# Patient Record
Sex: FEMALE | Race: BLACK OR AFRICAN AMERICAN | NOT HISPANIC OR LATINO | URBAN - METROPOLITAN AREA
[De-identification: names, ages, dates, MRNs, and addresses within clinical notes are randomized per-mention and may not be internally consistent; named-entity substitution may affect disease eponyms.]

---

## 2023-10-11 ENCOUNTER — APPOINTMENT (OUTPATIENT)
Dept: ORTHOPEDIC SURGERY | Facility: CLINIC | Age: 63
End: 2023-10-11
Payer: MEDICAID

## 2024-10-07 ENCOUNTER — TRANSCRIBE ORDERS (OUTPATIENT)
Dept: ORTHOPEDIC SURGERY | Facility: HOSPITAL | Age: 64
End: 2024-10-07
Payer: COMMERCIAL

## 2024-10-07 DIAGNOSIS — M54.50 LOW BACK PAIN, UNSPECIFIED BACK PAIN LATERALITY, UNSPECIFIED CHRONICITY, UNSPECIFIED WHETHER SCIATICA PRESENT: ICD-10-CM

## 2024-10-08 ENCOUNTER — HOSPITAL ENCOUNTER (OUTPATIENT)
Dept: RADIOLOGY | Facility: CLINIC | Age: 64
Discharge: HOME | End: 2024-10-08
Payer: COMMERCIAL

## 2024-10-08 ENCOUNTER — OFFICE VISIT (OUTPATIENT)
Dept: ORTHOPEDIC SURGERY | Facility: CLINIC | Age: 64
End: 2024-10-08
Payer: COMMERCIAL

## 2024-10-08 ENCOUNTER — APPOINTMENT (OUTPATIENT)
Dept: ORTHOPEDIC SURGERY | Facility: CLINIC | Age: 64
End: 2024-10-08
Payer: COMMERCIAL

## 2024-10-08 DIAGNOSIS — M54.9 SPINE PAIN: ICD-10-CM

## 2024-10-08 DIAGNOSIS — M54.12 CERVICAL RADICULOPATHY: Primary | ICD-10-CM

## 2024-10-08 DIAGNOSIS — F17.200 SMOKER: ICD-10-CM

## 2024-10-08 DIAGNOSIS — M50.30 DEGENERATIVE DISC DISEASE, CERVICAL: ICD-10-CM

## 2024-10-08 DIAGNOSIS — M54.50 LOW BACK PAIN, UNSPECIFIED BACK PAIN LATERALITY, UNSPECIFIED CHRONICITY, UNSPECIFIED WHETHER SCIATICA PRESENT: ICD-10-CM

## 2024-10-08 PROCEDURE — 72050 X-RAY EXAM NECK SPINE 4/5VWS: CPT | Performed by: RADIOLOGY

## 2024-10-08 PROCEDURE — 1125F AMNT PAIN NOTED PAIN PRSNT: CPT | Performed by: PHYSICIAN ASSISTANT

## 2024-10-08 PROCEDURE — 72070 X-RAY EXAM THORAC SPINE 2VWS: CPT

## 2024-10-08 PROCEDURE — 72050 X-RAY EXAM NECK SPINE 4/5VWS: CPT

## 2024-10-08 PROCEDURE — 99213 OFFICE O/P EST LOW 20 MIN: CPT | Performed by: PHYSICIAN ASSISTANT

## 2024-10-08 PROCEDURE — 72070 X-RAY EXAM THORAC SPINE 2VWS: CPT | Performed by: RADIOLOGY

## 2024-10-08 PROCEDURE — 99203 OFFICE O/P NEW LOW 30 MIN: CPT | Performed by: PHYSICIAN ASSISTANT

## 2024-10-08 RX ORDER — METHOCARBAMOL 750 MG/1
750 TABLET, FILM COATED ORAL 3 TIMES DAILY PRN
Qty: 30 TABLET | Refills: 0 | Status: SHIPPED | OUTPATIENT
Start: 2024-10-08 | End: 2024-10-18

## 2024-10-08 RX ORDER — METHOCARBAMOL 750 MG/1
750 TABLET, FILM COATED ORAL 3 TIMES DAILY PRN
COMMUNITY
Start: 2024-07-18 | End: 2024-10-08 | Stop reason: SDUPTHER

## 2024-10-08 RX ORDER — LIDOCAINE 50 MG/G
1 PATCH TOPICAL
COMMUNITY
Start: 2024-07-11

## 2024-10-08 RX ORDER — HYDROCHLOROTHIAZIDE 12.5 MG/1
12.5 TABLET ORAL
COMMUNITY
Start: 2024-08-21 | End: 2025-02-17

## 2024-10-08 RX ORDER — DOCUSATE SODIUM 100 MG/1
CAPSULE, LIQUID FILLED ORAL
COMMUNITY

## 2024-10-08 RX ORDER — DIPHENHYDRAMINE HCL 25 MG
TABLET ORAL
COMMUNITY

## 2024-10-08 RX ORDER — DOXYLAMINE SUCCINATE 25 MG
TABLET ORAL
COMMUNITY
Start: 2024-06-12

## 2024-10-08 ASSESSMENT — ENCOUNTER SYMPTOMS
OCCASIONAL FEELINGS OF UNSTEADINESS: 0
LOSS OF SENSATION IN FEET: 0
DEPRESSION: 0

## 2024-10-08 ASSESSMENT — PAIN - FUNCTIONAL ASSESSMENT: PAIN_FUNCTIONAL_ASSESSMENT: 0-10

## 2024-10-08 ASSESSMENT — PAIN DESCRIPTION - DESCRIPTORS: DESCRIPTORS: SHARP;STABBING;ACHING

## 2024-10-08 ASSESSMENT — PAIN SCALES - GENERAL: PAINLEVEL_OUTOF10: 9

## 2024-10-08 NOTE — PROGRESS NOTES
HPI:  Jaylin Nagy is a 64 y.o. female who presents to the spine clinic to establish care. Main concern today is severe neck pain.     Reports neck and low back pain ongoing since an accident in May 2023 - patient reports she fell and landed hard on her back/tailbone and also experienced a whiplash sensation in the neck.   Reports severe neck pain, cervicogenic headaches, and decreased ROM. Admits to numbness bilateral upper extremities, R>L. No focal motor weakness, however has left shoulder RTC injury that causes pain/weakness in the left shoulder.     She has been following with [facility] (LOV 09/11/24, 07/26/24) and has MRI cervical and lumbar spine pending (scheduled for 10/10/24). She has completed multiple rounds of PT at [facility] and [facility] this past year.     She is a smoker. She is not diabetic.     ROS:  Reviewed on EMR and patient intake sheet.    PMH/SH:  Reviewed on EMR and patient intake sheet.    Exam:  Physical Exam  Spine Musculoskeletal Exam    Well appearing, NAD  Decreased ROM cervical spine with rotation, flexion/extension  + paraspinal muscle spasms  upper extremity sensation intact to light touch  Give way weakness left deltoid due to pain; remaining upper extremity motor 5/5 throughout  normal gait & station    Radiology:     Xrays cervical and thoracic spine done in the office today 10/8/24 personally reviewed. Thoracic spine radiographs unremarkable. Reversal of the normal cervical lordosis with severe disc degeneration C5-6.       Assessment and Plan:  1. Degenerative disc disease, cervical  - XR cervical spine complete 4-5 views; Future  - Referral to Physical Therapy; Future    2. Cervical radiculopathy  - methocarbamol (Robaxin) 750 mg tablet; Take 1 tablet (750 mg) by mouth 3 times a day as needed for muscle spasms for up to 10 days.  Dispense: 30 tablet; Refill: 0    3. Smoker    I reviewed the xrays performed today with the patient. At this point I agree with MRI  scans to further evaluate, and I asked her to bring a copy for review once completed to determine next steps.     In the meantime I offered Prednisone taper for pain relief which she declined. She did request refill of Robaxin 750mg today which was provided.     She was also given a new referral for aqua-therapy as this has been helpful in the past.     Patient in agreement to plan of care. Of course Jaylin Nagy is welcome to call at anytime with questions or concerns.     Cayla Arciniega PA-C  Department of Orthopaedic Surgery    [address] [place] [address]    Voicemail: [phone]   Appts: [phone]  Fax: [phone]

## 2024-10-21 ENCOUNTER — DOCUMENTATION (OUTPATIENT)
Dept: PHYSICAL THERAPY | Facility: HOSPITAL | Age: 64
End: 2024-10-21
Payer: COMMERCIAL

## 2024-10-21 NOTE — PROGRESS NOTES
Physical Therapy                 Therapy Communication Note    Patient Name: Jaylin Nagy  MRN: [id number]  Today's Date: 10/21/2024     Discipline: Physical Therapy    Missed Time: No Show    Missed Visit Reason:  Pt no show to initial physical therapy evaluation.

## 2024-10-22 ENCOUNTER — APPOINTMENT (OUTPATIENT)
Dept: ORTHOPEDIC SURGERY | Facility: CLINIC | Age: 64
End: 2024-10-22
Payer: COMMERCIAL

## 2024-11-05 ENCOUNTER — APPOINTMENT (OUTPATIENT)
Dept: ORTHOPEDIC SURGERY | Facility: CLINIC | Age: 64
End: 2024-11-05
Payer: MEDICARE

## 2024-11-12 ENCOUNTER — OFFICE VISIT (OUTPATIENT)
Dept: ORTHOPEDIC SURGERY | Facility: CLINIC | Age: 64
End: 2024-11-12
Payer: MEDICARE

## 2024-11-12 VITALS — HEIGHT: 68 IN | BODY MASS INDEX: 32.58 KG/M2 | WEIGHT: 215 LBS

## 2024-11-12 DIAGNOSIS — M54.16 LUMBAR RADICULOPATHY: ICD-10-CM

## 2024-11-12 DIAGNOSIS — M54.12 CERVICAL RADICULOPATHY: Primary | ICD-10-CM

## 2024-11-12 PROCEDURE — 99214 OFFICE O/P EST MOD 30 MIN: CPT | Performed by: ORTHOPAEDIC SURGERY

## 2024-11-12 ASSESSMENT — PAIN - FUNCTIONAL ASSESSMENT: PAIN_FUNCTIONAL_ASSESSMENT: 0-10

## 2024-11-12 NOTE — LETTER
November 12, 2024     Naida Esqueda MD  [address]  As Of 10/13/2021  [facility] [id number]    Patient: Jaylin Nagy   YOB: 1960   Date of Visit: 11/12/2024       Dear Dr. Naida Esqueda MD:    Thank you for referring Jaylin Nagy to me for evaluation. Below are my notes for this consultation.  If you have questions, please do not hesitate to call me. I look forward to following your patient along with you.       Sincerely,     Lm Faye MD      CC: No Recipients  ______________________________________________________________________________________    HPI:Jaylin Nagy is a 64-year-old woman, who comes in with complaints of bilateral upper extremity numbness and tingling right greater than left.  She also has some intermittent pain in her back and into her leg.  She has discomfort in the intrascapular region.  She has completed physical therapy.  She has not had steroid injections.  Physical therapy was completed at [facility].      ROS:  Reviewed on EMR and patient intake sheet.    PMH/SH:   Reviewed on EMR and patient intake sheet.    Exam:  Physical Exam    Constitutional: Well appearing; no acute distress  Eyes: pupils are equal and round  Psych: normal affect  Respiratory: non-labored breathing  Cardiovascular: regular rate and rhythm  GI: non-distended abdomen  Musculoskeletal: no pain with range of motion of the shoulders bilaterally; no signs of impingement  Neurologic: [4+]/5 strength in the upper extremities bilaterally]; [negative] Russ's; [no hyper-reflexia]    Radiology:  MRI from [facility] demonstrates moderately severe foraminal narrowing at C5-6 and C6-7 with associated disc degeneration.    Diagnosis:  Cervical radiculopathy    Assessment and Plan:   64-year-old woman with chronic cervical radiculopathy which has persisted despite physical therapy.  Surgical intervention would be the next step.  She would need a C5-7 ACDF.  Prior to surgery she  would need to stop smoking.  She committed to that today.  She will give us a call in 6 weeks for nicotine testing.  Assuming that is negative we will proceed.  She was given referral to pain management for lumbar injections.    We discussed the role of pain management and steroid injections.  This included a brief overview of the injection procedure itself, the rationale behind this procedure, and the appropriate expectations for treatment of the patient's pain.  A referral to a pain management specialist was offered to the patient.    The patient was in agreement with the plan. At the end of the visit today, the patient felt that all questions had been answered satisfactorily.  The patient was pleased with the visit and very appreciative for the care rendered.     Thank you very much for the kind referral.  It is a privilege, and a pleasure, to partner with you in the care of your patients.  I would be delighted to assist you with any further consultations as needed.      Lm Faye MD    Chief of Spine Surgery, [facility]  Director of Spine Service, [facility]  , Department of Orthopaedics  [facility] [employer]  [id number] [place] [address] [place] [address]  P: [phone]  [email or web address]    This note was dictated with voice recognition software.  It has not been proofread for grammatical errors, typographical mistakes or other semantic inconsistencies.

## 2024-11-12 NOTE — PROGRESS NOTES
HPI:Jaylin Nagy is a 64-year-old woman, who comes in with complaints of bilateral upper extremity numbness and tingling right greater than left.  She also has some intermittent pain in her back and into her leg.  She has discomfort in the intrascapular region.  She has completed physical therapy.  She has not had steroid injections.  Physical therapy was completed at [facility].      ROS:  Reviewed on EMR and patient intake sheet.    PMH/SH:   Reviewed on EMR and patient intake sheet.    Exam:  Physical Exam    Constitutional: Well appearing; no acute distress  Eyes: pupils are equal and round  Psych: normal affect  Respiratory: non-labored breathing  Cardiovascular: regular rate and rhythm  GI: non-distended abdomen  Musculoskeletal: no pain with range of motion of the shoulders bilaterally; no signs of impingement  Neurologic: [4+]/5 strength in the upper extremities bilaterally]; [negative] Russ's; [no hyper-reflexia]    Radiology:  MRI from [facility] demonstrates moderately severe foraminal narrowing at C5-6 and C6-7 with associated disc degeneration.    Diagnosis:  Cervical radiculopathy    Assessment and Plan:   64-year-old woman with chronic cervical radiculopathy which has persisted despite physical therapy.  Surgical intervention would be the next step.  She would need a C5-7 ACDF.  Prior to surgery she would need to stop smoking.  She committed to that today.  She will give us a call in 6 weeks for nicotine testing.  Assuming that is negative we will proceed.  She was given referral to pain management for lumbar injections.    We discussed the role of pain management and steroid injections.  This included a brief overview of the injection procedure itself, the rationale behind this procedure, and the appropriate expectations for treatment of the patient's pain.  A referral to a pain management specialist was offered to the patient.    The patient was in agreement with the plan. At the end of the  visit today, the patient felt that all questions had been answered satisfactorily.  The patient was pleased with the visit and very appreciative for the care rendered.     Thank you very much for the kind referral.  It is a privilege, and a pleasure, to partner with you in the care of your patients.  I would be delighted to assist you with any further consultations as needed.      Lm Faye MD    Chief of Spine Surgery, [facility]  Director of Spine Service, [facility]  , Department of Orthopaedics  [facility] [employer]  [address] [place] [id number]  P: [phone]  [facility]    This note was dictated with voice recognition software.  It has not been proofread for grammatical errors, typographical mistakes or other semantic inconsistencies.

## 2025-03-10 ENCOUNTER — EVALUATION (OUTPATIENT)
Dept: PHYSICAL THERAPY | Facility: HOSPITAL | Age: 65
End: 2025-03-10
Payer: COMMERCIAL

## 2025-03-10 DIAGNOSIS — M50.30 DEGENERATIVE DISC DISEASE, CERVICAL: Primary | ICD-10-CM

## 2025-03-10 PROCEDURE — 97162 PT EVAL MOD COMPLEX 30 MIN: CPT | Mod: GP | Performed by: PHYSICAL THERAPIST

## 2025-03-10 ASSESSMENT — ENCOUNTER SYMPTOMS
OCCASIONAL FEELINGS OF UNSTEADINESS: 0
DEPRESSION: 0
LOSS OF SENSATION IN FEET: 0

## 2025-03-10 ASSESSMENT — PAIN - FUNCTIONAL ASSESSMENT: PAIN_FUNCTIONAL_ASSESSMENT: 0-10

## 2025-03-10 ASSESSMENT — PAIN SCALES - GENERAL
PAINLEVEL_OUTOF10: 3
PAINLEVEL_OUTOF10: 3

## 2025-03-10 NOTE — PROGRESS NOTES
Physical Therapy    Physical Therapy Evaluation and Treatment      Patient Name: Jaylin Nagy  MRN: [id number]  Today's Date: 3-  Visit #1    Time Entry:   Time Calculation  Start Time: 1030  Stop Time: 1115  Time Calculation (min): 45 min                         Assessment:   Neck pain, Pt has chronic pain, Has request to do aquatics PT.  She has chronic pain and does not get  out of the house other than to med appointments. Hx of mental health issues and is sedentary.  Pt agrees to try pool therapy but is not interested in land based exercises or modalities. Plan to see how it goes in pool and progress as able         Plan:  OP PT Plan  Treatment/Interventions: Aquatic therapy, Education/ Instruction, Hot pack, Therapeutic exercises, Manual therapy, Neuromuscular re-education, Ultrasound  PT Plan: Skilled PT  PT Frequency: 2 times per week  Duration: 6 weeks  Onset Date: 05/31/23  Certification Period Start Date: 03/10/25  Certification Period End Date: 06/10/25  Rehab Potential: Fair (hx of not returning for PT after first visit)  Plan of Care Agreement: Patient    Current Problem:   1. Degenerative disc disease, cervical  Referral to Physical Therapy    Follow Up In Physical Therapy          Subjective    Pt states she has chronic neck and back pain and radicular symptoms in Amado UE and LE  3-10/10   She states she has gone to PT for 1 time eval at other locations but did not return to therapy in the past due to too much pain.   PT is ordered for aquatics     General: degenerative disc disease, cervical     Precautions:          Pain: 3-10/10 neck and back and Bi UE and LE radicular                 Objective        General Assessments:  Jaylin has been referred to PT for Degenerative disc disease, cervical   Neck pain for 2 years after having had a fall.  No falls reported since that time.  PT referral for aquatics PT    Pt has been to PT for neck eval at [facility] but did not return for therapy as she says  everything hurts too bad .  She had  accupuncture continues to have this  Hx of having electrical stim - did not like it  Hx of relief with heat   2-10 / 10    today 3/10 , if pain were worse she feels like she is choking.  Pain can be severe and intermittent and can last several minutes.  Numbness down arm to fingers amado less than daily.   Holding head down to fill out paperwork increased pain.  Turning head can be sharp  Pt also has entire back pain after falling 2 years ago and was hoping to do PT for her back. AMADO legs numb / tingling to toes.    She has seen surgeon and pain management-declines surgery and epidurals; saw chiropractor and had acupuncture recently     Diagnosis    1. Stenosis, cervical spine     2. Other cirrhosis of liver (HCC)     3. History of hepatitis C     4. Essential hypertension     5. Tobacco use     6. Chemical dependency (HCC)     7. Schizoaffective disorder, unspecified type (HCC)         Functional Assessments:  Independent, no falls since 2 yrs ago  Uses Door Dash to avoid leaving the house  Sedentary, goes out for med appts only  Schizoaffective disorder, hx chem dependency      Extremity/Trunk Assessments:    Cervical   Flex WNL  Side bend 20 L   20 Rt  Rotate 30 L  35 Rt     Shoulder AROM WNL    Lumbar  Flex full  Ext neutral  Side bend WFL Amado    MMT  Hip flex 4/5       Cervical back: Spasms and tenderness present.   Thoracic back: Spasms and tenderness present.   Lumbar back: Spasms and tenderness present.     Mild multi-level degenerative disc disease noted with moderate   degenerative changes at C5-C6. There are moderate spondylitic changes and   facet arthropathy noted. Anterolisthesis noted at C3-C4 and C4-C5 best   seen on flexion view and retrolisthesis at C5-C6 best seen on extension      degenerative spine changes with multilevel marginal vertebral spurs. There is C5-C6 disc space narrowing. There is mild decreased height of C5, C6, and probably C7 vertebrae, not  significantly changed from the appearance on 6/4/2023 CT. There is reversal of the cervical lordosis. Evaluation of the posterior element and neural foramina is limited without oblique views but there is multilevel facet joint arthropathy.     The alignment of the thoracic spine is normal.  No compression     No acute bony abnormality of the lumbar spine is identified. Mild diffuse degenerative changes.       Outcome Measures:   LBP 30=60%  NDI 26 raw = 52%    Treatments:  EXERCISES       Date 3-10-25      VISIT# #1 # # #    REPS REPS REPS REPS                        Doorway stretch       Neck stretch upper traps  Levator scapulae str       Cervical retraction PROM                                   HEAT              AQUATICS       HEP            EDUCATION: plan of care  . May do aquatics PT   No tx started this date.        Goals:  Active       PT Problem       PT Goal        Start:  03/11/25    Expected End:  04/01/25       Pt to tolerate aquatics PT and demo good attendance and follow through  with PT visits     Pt to tolerate increased ease with getting out of her house for community mobility as her neck pain is reduced     Pt to demo understanding of neutral posture and alignment and increased awareness to reduce pain         PT Goal        Start:  03/11/25    Expected End:  04/22/25       Pt to have 50% less neck pain     Pt to increase NDI by  4 or more for increased ease with functional activities     Pt to be compliant with home exercises for posture and core strength

## 2025-03-25 ENCOUNTER — DOCUMENTATION (OUTPATIENT)
Dept: PHYSICAL THERAPY | Facility: HOSPITAL | Age: 65
End: 2025-03-25
Payer: MEDICARE

## 2025-03-25 ENCOUNTER — APPOINTMENT (OUTPATIENT)
Dept: PHYSICAL THERAPY | Facility: HOSPITAL | Age: 65
End: 2025-03-25
Payer: COMMERCIAL

## 2025-03-25 NOTE — PROGRESS NOTES
Physical Therapy                 Therapy Communication Note    Patient Name: Jaylin Nagy  MRN: [id number]  Department:   Room: Room/bed info not found  Today's Date: 3/25/2025     Discipline: Physical Therapy          Missed Visit Reason:      Missed Time: Cancel    Comment: pt having bathing suit issues needed to re-schedule appt

## 2025-03-26 ENCOUNTER — TREATMENT (OUTPATIENT)
Dept: PHYSICAL THERAPY | Facility: HOSPITAL | Age: 65
End: 2025-03-26
Payer: COMMERCIAL

## 2025-03-26 DIAGNOSIS — M50.30 DEGENERATIVE DISC DISEASE, CERVICAL: ICD-10-CM

## 2025-03-26 PROCEDURE — 97113 AQUATIC THERAPY/EXERCISES: CPT | Mod: GP,CQ | Performed by: PHYSICAL THERAPY ASSISTANT

## 2025-03-26 ASSESSMENT — PAIN - FUNCTIONAL ASSESSMENT: PAIN_FUNCTIONAL_ASSESSMENT: 0-10

## 2025-03-26 ASSESSMENT — PAIN SCALES - GENERAL
PAINLEVEL_OUTOF10: 8
PAINLEVEL_OUTOF10: 8

## 2025-03-26 NOTE — PROGRESS NOTES
"Physical Therapy    Physical Therapy Treatment    Patient Name: Jaylin Nagy  MRN: [id number]  Today's Date: 3/26/2025  Time Calculation  Start Time: 0315  Stop Time: 0357  Time Calculation (min): 42 min  VISIT 2  PT Therapeutic Procedures Time Entry  Aquatic Therapy Time Entry: 42    Assessment:  PT Assessment  PT Assessment Results: Decreased range of motion, Decreased strength, Pain, Orthopedic restrictions  Rehab Prognosis: Good  Assessment Comment: pt seemed pleased with aquatic PT. She was limited at times secondary to not wanting to perform exercises or gait in 3'5\" section of pool. Education was provided on technique throughout session,    Plan:  OP PT Plan  Treatment/Interventions: Aquatic therapy, Education/ Instruction, Hot pack, Therapeutic exercises, Manual therapy, Neuromuscular re-education, Ultrasound  PT Plan: Skilled PT  PT Frequency: 2 times per week  Duration: 6 weeks  Onset Date: 05/31/23  Certification Period Start Date: 03/10/25  Certification Period End Date: 06/10/25  Rehab Potential: Fair (hx of not returning for PT after first visit)  Plan of Care Agreement: Patient    Current Problem  1. Degenerative disc disease, cervical  Follow Up In Physical Therapy          Subjective  pt requested to do program in 5 feet secondary feeling cold. Pt reporting pain that is consistent since falling 2 yrs ago. Jaylin stated she has not exercises in 2 years.        Precautions  Precautions  STEADI Fall Risk Score (The score of 4 or more indicates an increased risk of falling): 0(no falls)     Pain  Pain Assessment: 0-10  0-10 (Numeric) Pain Score: 8 (Acute Pain Management: Operative or Medical Procedures and Trauma, Clinical Practice Guideline No. 1. AHCPR Publication No. ; February 1992. [employer], [place], [place]; pages 116-117. CPR renamed Agency for  research)  Pain Type: Chronic pain  Pain Location: Neck  Pain Orientation: Lower  Pain Radiating Towards: Amado UE " "intermittent  Multiple Pain Sites: Two    Objective initiated aquatic exercises this session.          Treatments:     Aquatic Exercise  Aquatic Exercise Performed: Yes  Aquatic Exercise Activity 1: ambulation forward retro and lateral in 5'x6 laps  Aquatic Exercise Activity 2: Marching 5' x 6 laps  Aquatic Exercise Activity 3: HR &TR in 5 ' x10  Aquatic Exercise Activity 4: SLR 5' forward, retro, lateral x 10  Aquatic Exercise Activity 5: squats 3.5' x10  Aquatic Exercise Activity 6: deep water bike and distraction x 5\" each on noodle  Aquatic Exercise Activity 7: Gastroc stretch x3 30\" h      Goals:  Active       PT Problem       PT Goal        Start:  03/11/25    Expected End:  04/01/25       Pt to tolerate aquatics PT and demo good attendance and follow through  with PT visits     Pt to tolerate increased ease with getting out of her house for community mobility as her neck pain is reduced     Pt to demo understanding of neutral posture and alignment and increased awareness to reduce pain         PT Goal        Start:  03/11/25    Expected End:  04/22/25       Pt to have 50% less neck pain     Pt to increase NDI by  4 or more for increased ease with functional activities     Pt to be compliant with home exercises for posture and core strength          Physical Therapy  "

## 2025-04-02 ENCOUNTER — TREATMENT (OUTPATIENT)
Dept: PHYSICAL THERAPY | Facility: HOSPITAL | Age: 65
End: 2025-04-02
Payer: COMMERCIAL

## 2025-04-02 DIAGNOSIS — M50.30 DEGENERATIVE DISC DISEASE, CERVICAL: ICD-10-CM

## 2025-04-02 PROCEDURE — 97113 AQUATIC THERAPY/EXERCISES: CPT | Mod: GP,CQ | Performed by: PHYSICAL THERAPY ASSISTANT

## 2025-04-02 ASSESSMENT — PAIN SCALES - GENERAL: PAINLEVEL_OUTOF10: 7

## 2025-04-02 ASSESSMENT — PAIN - FUNCTIONAL ASSESSMENT: PAIN_FUNCTIONAL_ASSESSMENT: 0-10

## 2025-04-02 NOTE — PROGRESS NOTES
"Physical Therapy    Physical Therapy Treatment    Patient Name: Jaylin Nagy  MRN: [id number]  Today's Date: 4/2/2025  Time Calculation  Start Time: 0750  Stop Time: 0833  Time Calculation (min): 43 min  VISIT 3  PT Therapeutic Procedures Time Entry  Aquatic Therapy Time Entry: 43    Assessment:  PT Assessment  PT Assessment Results: Decreased range of motion, Decreased strength, Pain, Orthopedic restrictions  Rehab Prognosis: Good  Assessment Comment: pt required frequent rest breaks and had difficulty completing treatment secondary to pain and discomfort. pt was educated on benefits of aquatic therapy, as well as, posture and technique throughout session. pt alternated between exercises, gait and distraction. Pt reported her pain was decreased at end of session but did not rate.    Plan:       Current Problem  1. Degenerative disc disease, cervical  Follow Up In Physical Therapy          Subjective  Jaylin reported her pain is bad today secondary to lifting 3 cases of water yesterday.pt reported she knows therapy is going to help her but it is painful to perform at times.        Precautions  Precautions  STEADI Fall Risk Score (The score of 4 or more indicates an increased risk of falling): 0(no falls)     Pain  Pain Assessment: 0-10  Pain Type: Chronic pain  Pain Location: Neck  Pain Orientation: Lower  Pain Radiating Towards: Amado UE intermittent  Multiple Pain Sites: Two    Objective increased reps as per flow sheet. Pt performed gait and squats in 3.5'        Treatments:     Aquatic Exercise  Aquatic Exercise Performed: Yes  Aquatic Exercise Activity 1: ambulation forward and lateral x 2  laps each Backward 1/2 lap  Aquatic Exercise Activity 2: Marching  x2 laps  Aquatic Exercise Activity 3: HR &TR in 5 ' x15  Aquatic Exercise Activity 4: SLR 5' forward, retro, lateral x 15  Aquatic Exercise Activity 5: squats 3.5' x15  Aquatic Exercise Activity 6: deep water bike 5 min and distraction x 5\" x2 in flex on " "noodle  Aquatic Exercise Activity 7: Gastroc stretch x3 30\" h      Goals:  Active       PT Problem       PT Goal        Start:  03/11/25    Expected End:  04/01/25       Pt to tolerate aquatics PT and demo good attendance and follow through  with PT visits     Pt to tolerate increased ease with getting out of her house for community mobility as her neck pain is reduced     Pt to demo understanding of neutral posture and alignment and increased awareness to reduce pain         PT Goal        Start:  03/11/25    Expected End:  04/22/25       Pt to have 50% less neck pain     Pt to increase NDI by  4 or more for increased ease with functional activities     Pt to be compliant with home exercises for posture and core strength            "

## 2025-04-04 ENCOUNTER — APPOINTMENT (OUTPATIENT)
Dept: PHYSICAL THERAPY | Facility: HOSPITAL | Age: 65
End: 2025-04-04
Payer: COMMERCIAL

## 2025-04-04 ENCOUNTER — DOCUMENTATION (OUTPATIENT)
Dept: PHYSICAL THERAPY | Facility: HOSPITAL | Age: 65
End: 2025-04-04
Payer: MEDICARE

## 2025-04-04 NOTE — PROGRESS NOTES
Physical Therapy                 Therapy Communication Note    Patient Name: Jaylin Nagy  MRN: [id number]  Department: [address]  Room: Room/bed info not found  Today's Date: 4/4/2025     Discipline: Physical Therapy          Missed Visit Reason:      Missed Time: Cancel    Comment:ill

## 2025-04-08 ENCOUNTER — TREATMENT (OUTPATIENT)
Dept: PHYSICAL THERAPY | Facility: HOSPITAL | Age: 65
End: 2025-04-08
Payer: COMMERCIAL

## 2025-04-08 DIAGNOSIS — M50.30 DEGENERATIVE DISC DISEASE, CERVICAL: ICD-10-CM

## 2025-04-08 PROCEDURE — 97113 AQUATIC THERAPY/EXERCISES: CPT | Mod: GP,CQ

## 2025-04-08 ASSESSMENT — PAIN - FUNCTIONAL ASSESSMENT: PAIN_FUNCTIONAL_ASSESSMENT: 0-10

## 2025-04-08 ASSESSMENT — PAIN SCALES - GENERAL
PAINLEVEL_OUTOF10: 7
PAINLEVEL_OUTOF10: 7

## 2025-04-08 NOTE — PROGRESS NOTES
"Physical Therapy    Physical Therapy Treatment    Patient Name: Jaylin Nagy  MRN: [id number]  Today's Date: 4/8/2025  Time Calculation  Start Time: 0805  Stop Time: 0850  Time Calculation (min): 45 min  VISIT 4  PT Therapeutic Procedures Time Entry  Aquatic Therapy Time Entry: 45    Assessment:  PT Assessment  Assessment Comment: pt needing VC for posture and stay on task with exs and intensity, pain no change with treatment today, pt reports decreased tightness in Amado Hips    Plan:  OP PT Plan  Treatment/Interventions: Aquatic therapy, Education/ Instruction, Hot pack, Therapeutic exercises, Manual therapy, Neuromuscular re-education, Ultrasound  PT Plan: Skilled PT  PT Frequency: 2 times per week  Duration: 6 weeks  Onset Date: 05/31/23  Certification Period Start Date: 03/10/25  Certification Period End Date: 06/10/25    Current Problem  1. Degenerative disc disease, cervical  Follow Up In Physical Therapy          Subjective  pt reports pain 7/10 in neck and LB today Amado UE and Hips are sore        Precautions  Precautions  Precautions Comment: no change     Pain  Pain Assessment: 0-10  0-10 (Numeric) Pain Score: 7  Pain Type: Chronic pain  Pain Location: Neck  Pain Orientation: Lower  Pain Radiating Towards: Amado UE    Objective increased amb in 3'6\" section, TR/HR in 3'6\"        Treatments:     Aquatic Exercise  Aquatic Exercise Performed: Yes  Aquatic Exercise Activity 1: ambulation forward/ lateral/Retro  x 2  Aquatic Exercise Activity 2: Marching  x2 laps  Aquatic Exercise Activity 3: HR &TR x15  Aquatic Exercise Activity 4: SLR 5' forward, retro, lateral x 15  Aquatic Exercise Activity 5: squats 3.5' x15  Aquatic Exercise Activity 6: deep water bikex 5 min and distraction x 5\"  FLEX  Aquatic Exercise Activity 7: Gastroc stretch x3 30\" h  Aquatic Exercise Activity 8: step upsx 10 EA 5' step      Goals:  Active       PT Problem       PT Goal        Start:  03/11/25    Expected End:  04/01/25       Pt to tolerate " aquatics PT and demo good attendance and follow through  with PT visits     Pt to tolerate increased ease with getting out of her house for community mobility as her neck pain is reduced     Pt to demo understanding of neutral posture and alignment and increased awareness to reduce pain         PT Goal        Start:  03/11/25    Expected End:  04/22/25       Pt to have 50% less neck pain     Pt to increase NDI by  4 or more for increased ease with functional activities     Pt to be compliant with home exercises for posture and core strength

## 2025-04-10 ENCOUNTER — APPOINTMENT (OUTPATIENT)
Dept: PHYSICAL THERAPY | Facility: HOSPITAL | Age: 65
End: 2025-04-10
Payer: COMMERCIAL

## 2025-04-15 ENCOUNTER — APPOINTMENT (OUTPATIENT)
Dept: PHYSICAL THERAPY | Facility: HOSPITAL | Age: 65
End: 2025-04-15
Payer: COMMERCIAL

## 2025-04-17 ENCOUNTER — APPOINTMENT (OUTPATIENT)
Dept: PHYSICAL THERAPY | Facility: HOSPITAL | Age: 65
End: 2025-04-17
Payer: COMMERCIAL

## 2025-04-22 ENCOUNTER — APPOINTMENT (OUTPATIENT)
Dept: PHYSICAL THERAPY | Facility: HOSPITAL | Age: 65
End: 2025-04-22
Payer: COMMERCIAL

## 2025-04-23 ENCOUNTER — APPOINTMENT (OUTPATIENT)
Dept: PHYSICAL THERAPY | Facility: HOSPITAL | Age: 65
End: 2025-04-23
Payer: COMMERCIAL

## 2025-04-24 ENCOUNTER — APPOINTMENT (OUTPATIENT)
Dept: PHYSICAL THERAPY | Facility: HOSPITAL | Age: 65
End: 2025-04-24
Payer: COMMERCIAL

## 2025-04-29 ENCOUNTER — APPOINTMENT (OUTPATIENT)
Dept: PHYSICAL THERAPY | Facility: HOSPITAL | Age: 65
End: 2025-04-29
Payer: COMMERCIAL

## 2025-05-01 ENCOUNTER — DOCUMENTATION (OUTPATIENT)
Dept: PHYSICAL THERAPY | Facility: HOSPITAL | Age: 65
End: 2025-05-01
Payer: MEDICARE

## 2025-05-01 ENCOUNTER — APPOINTMENT (OUTPATIENT)
Dept: PHYSICAL THERAPY | Facility: HOSPITAL | Age: 65
End: 2025-05-01
Payer: MEDICARE

## 2025-05-01 NOTE — PROGRESS NOTES
Physical Therapy                 Therapy Communication Note    Patient Name: Jaylin Nagy  MRN: [id number]  Department:   Room: Room/bed info not found  Today's Date: 5/1/2025     Discipline: Physical Therapy          Missed Visit Reason:      Missed Time: No Show    Comment: reassess next visit

## 2025-05-06 ENCOUNTER — APPOINTMENT (OUTPATIENT)
Dept: PHYSICAL THERAPY | Facility: HOSPITAL | Age: 65
End: 2025-05-06
Payer: MEDICARE

## 2025-06-12 ENCOUNTER — DOCUMENTATION (OUTPATIENT)
Dept: PHYSICAL THERAPY | Facility: HOSPITAL | Age: 65
End: 2025-06-12
Payer: MEDICARE

## 2025-06-12 NOTE — PROGRESS NOTES
Physical Therapy    Discharge Summary    Name: Jaylin Nagy  MRN: [id number]  : 1960  Date: 25    Discharge Summary: PT  Dx Degenerative disc disease cervical   Date of PT eval 3-10-25  Date of last PT visit 25  Total visit #4  Date of PT Discharge 25    Reason for discharge: Pt has no show , has not been to PT for greater than a month.    PT plan of care has .